# Patient Record
Sex: FEMALE | Race: ASIAN | NOT HISPANIC OR LATINO | URBAN - METROPOLITAN AREA
[De-identification: names, ages, dates, MRNs, and addresses within clinical notes are randomized per-mention and may not be internally consistent; named-entity substitution may affect disease eponyms.]

---

## 2018-04-17 ENCOUNTER — OUTPATIENT (OUTPATIENT)
Dept: OUTPATIENT SERVICES | Age: 28
LOS: 1 days | Discharge: HOME | End: 2018-04-17

## 2018-04-18 ENCOUNTER — APPOINTMENT (OUTPATIENT)
Dept: PEDIATRIC CARDIOLOGY | Facility: CLINIC | Age: 28
End: 2018-04-18

## 2018-08-14 ENCOUNTER — INPATIENT (INPATIENT)
Facility: HOSPITAL | Age: 28
LOS: 2 days | Discharge: HOME | End: 2018-08-17
Attending: OBSTETRICS & GYNECOLOGY | Admitting: OBSTETRICS & GYNECOLOGY

## 2018-08-14 DIAGNOSIS — Z3A.00 WEEKS OF GESTATION OF PREGNANCY NOT SPECIFIED: ICD-10-CM

## 2018-08-14 DIAGNOSIS — O26.899 OTHER SPECIFIED PREGNANCY RELATED CONDITIONS, UNSPECIFIED TRIMESTER: ICD-10-CM

## 2018-08-15 ENCOUNTER — TRANSCRIPTION ENCOUNTER (OUTPATIENT)
Age: 28
End: 2018-08-15

## 2018-08-15 VITALS — WEIGHT: 130.07 LBS | HEIGHT: 65 IN

## 2018-08-15 LAB
BASOPHILS # BLD AUTO: 0.05 K/UL — SIGNIFICANT CHANGE UP (ref 0–0.2)
BASOPHILS NFR BLD AUTO: 0.3 % — SIGNIFICANT CHANGE UP (ref 0–2)
BLD GP AB SCN SERPL QL: NEGATIVE — SIGNIFICANT CHANGE UP
EOSINOPHIL # BLD AUTO: 0.03 K/UL — SIGNIFICANT CHANGE UP (ref 0–0.5)
EOSINOPHIL NFR BLD AUTO: 0.2 % — SIGNIFICANT CHANGE UP (ref 0–6)
HCT VFR BLD CALC: 36.8 % — SIGNIFICANT CHANGE UP (ref 34.5–45)
HGB BLD-MCNC: 12.7 G/DL — SIGNIFICANT CHANGE UP (ref 11.5–15.5)
IMM GRANULOCYTES # BLD AUTO: 0.05 # — SIGNIFICANT CHANGE UP
IMM GRANULOCYTES NFR BLD AUTO: 0.3 % — SIGNIFICANT CHANGE UP (ref 0–1.5)
LYMPHOCYTES # BLD AUTO: 1.35 K/UL — SIGNIFICANT CHANGE UP (ref 1–3.3)
LYMPHOCYTES # BLD AUTO: 8.7 % — LOW (ref 13–44)
MCHC RBC-ENTMCNC: 32 PG — SIGNIFICANT CHANGE UP (ref 27–34)
MCHC RBC-ENTMCNC: 34.5 % — SIGNIFICANT CHANGE UP (ref 32–36)
MCV RBC AUTO: 92.7 FL — SIGNIFICANT CHANGE UP (ref 80–100)
MONOCYTES # BLD AUTO: 1.08 K/UL — HIGH (ref 0–0.9)
MONOCYTES NFR BLD AUTO: 7 % — SIGNIFICANT CHANGE UP (ref 2–14)
NEUTROPHILS # BLD AUTO: 12.87 K/UL — HIGH (ref 1.8–7.4)
NEUTROPHILS NFR BLD AUTO: 83.5 % — HIGH (ref 43–77)
NRBC # FLD: 0 — SIGNIFICANT CHANGE UP
PLATELET # BLD AUTO: 235 K/UL — SIGNIFICANT CHANGE UP (ref 150–400)
PMV BLD: 11.6 FL — SIGNIFICANT CHANGE UP (ref 7–13)
RBC # BLD: 3.97 M/UL — SIGNIFICANT CHANGE UP (ref 3.8–5.2)
RBC # FLD: 14 % — SIGNIFICANT CHANGE UP (ref 10.3–14.5)
RH IG SCN BLD-IMP: POSITIVE — SIGNIFICANT CHANGE UP
RH IG SCN BLD-IMP: POSITIVE — SIGNIFICANT CHANGE UP
WBC # BLD: 15.43 K/UL — HIGH (ref 3.8–10.5)
WBC # FLD AUTO: 15.43 K/UL — HIGH (ref 3.8–10.5)

## 2018-08-15 RX ORDER — SIMETHICONE 80 MG/1
80 TABLET, CHEWABLE ORAL EVERY 6 HOURS
Qty: 0 | Refills: 0 | Status: DISCONTINUED | OUTPATIENT
Start: 2018-08-15 | End: 2018-08-17

## 2018-08-15 RX ORDER — DIBUCAINE 1 %
1 OINTMENT (GRAM) RECTAL EVERY 4 HOURS
Qty: 0 | Refills: 0 | Status: DISCONTINUED | OUTPATIENT
Start: 2018-08-15 | End: 2018-08-17

## 2018-08-15 RX ORDER — LANOLIN
1 OINTMENT (GRAM) TOPICAL EVERY 6 HOURS
Qty: 0 | Refills: 0 | Status: DISCONTINUED | OUTPATIENT
Start: 2018-08-15 | End: 2018-08-17

## 2018-08-15 RX ORDER — ACETAMINOPHEN 500 MG
975 TABLET ORAL EVERY 6 HOURS
Qty: 0 | Refills: 0 | Status: COMPLETED | OUTPATIENT
Start: 2018-08-15 | End: 2019-07-14

## 2018-08-15 RX ORDER — ACETAMINOPHEN 500 MG
3 TABLET ORAL
Qty: 0 | Refills: 0 | COMMUNITY
Start: 2018-08-15

## 2018-08-15 RX ORDER — SODIUM CHLORIDE 9 MG/ML
3 INJECTION INTRAMUSCULAR; INTRAVENOUS; SUBCUTANEOUS EVERY 8 HOURS
Qty: 0 | Refills: 0 | Status: DISCONTINUED | OUTPATIENT
Start: 2018-08-15 | End: 2018-08-15

## 2018-08-15 RX ORDER — OXYCODONE HYDROCHLORIDE 5 MG/1
5 TABLET ORAL
Qty: 0 | Refills: 0 | Status: DISCONTINUED | OUTPATIENT
Start: 2018-08-15 | End: 2018-08-17

## 2018-08-15 RX ORDER — IBUPROFEN 200 MG
600 TABLET ORAL EVERY 6 HOURS
Qty: 0 | Refills: 0 | Status: COMPLETED | OUTPATIENT
Start: 2018-08-15 | End: 2019-07-14

## 2018-08-15 RX ORDER — SODIUM CHLORIDE 9 MG/ML
3 INJECTION INTRAMUSCULAR; INTRAVENOUS; SUBCUTANEOUS EVERY 8 HOURS
Qty: 0 | Refills: 0 | Status: DISCONTINUED | OUTPATIENT
Start: 2018-08-15 | End: 2018-08-17

## 2018-08-15 RX ORDER — DIPHENHYDRAMINE HCL 50 MG
25 CAPSULE ORAL EVERY 6 HOURS
Qty: 0 | Refills: 0 | Status: DISCONTINUED | OUTPATIENT
Start: 2018-08-15 | End: 2018-08-17

## 2018-08-15 RX ORDER — MAGNESIUM HYDROXIDE 400 MG/1
30 TABLET, CHEWABLE ORAL
Qty: 0 | Refills: 0 | Status: DISCONTINUED | OUTPATIENT
Start: 2018-08-15 | End: 2018-08-17

## 2018-08-15 RX ORDER — HYDROCORTISONE 1 %
1 OINTMENT (GRAM) TOPICAL EVERY 4 HOURS
Qty: 0 | Refills: 0 | Status: DISCONTINUED | OUTPATIENT
Start: 2018-08-15 | End: 2018-08-15

## 2018-08-15 RX ORDER — PRAMOXINE HYDROCHLORIDE 150 MG/15G
1 AEROSOL, FOAM RECTAL EVERY 4 HOURS
Qty: 0 | Refills: 0 | Status: DISCONTINUED | OUTPATIENT
Start: 2018-08-15 | End: 2018-08-15

## 2018-08-15 RX ORDER — CITRIC ACID/SODIUM CITRATE 300-500 MG
15 SOLUTION, ORAL ORAL EVERY 4 HOURS
Qty: 0 | Refills: 0 | Status: DISCONTINUED | OUTPATIENT
Start: 2018-08-15 | End: 2018-08-15

## 2018-08-15 RX ORDER — GLYCERIN ADULT
1 SUPPOSITORY, RECTAL RECTAL AT BEDTIME
Qty: 0 | Refills: 0 | Status: DISCONTINUED | OUTPATIENT
Start: 2018-08-15 | End: 2018-08-17

## 2018-08-15 RX ORDER — DIBUCAINE 1 %
1 OINTMENT (GRAM) RECTAL EVERY 4 HOURS
Qty: 0 | Refills: 0 | Status: DISCONTINUED | OUTPATIENT
Start: 2018-08-15 | End: 2018-08-15

## 2018-08-15 RX ORDER — PRAMOXINE HYDROCHLORIDE 150 MG/15G
1 AEROSOL, FOAM RECTAL EVERY 4 HOURS
Qty: 0 | Refills: 0 | Status: DISCONTINUED | OUTPATIENT
Start: 2018-08-15 | End: 2018-08-17

## 2018-08-15 RX ORDER — AER TRAVELER 0.5 G/1
1 SOLUTION RECTAL; TOPICAL EVERY 4 HOURS
Qty: 0 | Refills: 0 | Status: DISCONTINUED | OUTPATIENT
Start: 2018-08-15 | End: 2018-08-17

## 2018-08-15 RX ORDER — OXYTOCIN 10 UNIT/ML
41.67 VIAL (ML) INJECTION
Qty: 20 | Refills: 0 | Status: DISCONTINUED | OUTPATIENT
Start: 2018-08-15 | End: 2018-08-15

## 2018-08-15 RX ORDER — IBUPROFEN 200 MG
600 TABLET ORAL EVERY 6 HOURS
Qty: 0 | Refills: 0 | Status: DISCONTINUED | OUTPATIENT
Start: 2018-08-15 | End: 2018-08-17

## 2018-08-15 RX ORDER — DOCUSATE SODIUM 100 MG
100 CAPSULE ORAL
Qty: 0 | Refills: 0 | Status: DISCONTINUED | OUTPATIENT
Start: 2018-08-15 | End: 2018-08-17

## 2018-08-15 RX ORDER — SODIUM CHLORIDE 9 MG/ML
1000 INJECTION, SOLUTION INTRAVENOUS ONCE
Qty: 0 | Refills: 0 | Status: DISCONTINUED | OUTPATIENT
Start: 2018-08-15 | End: 2018-08-15

## 2018-08-15 RX ORDER — SODIUM CHLORIDE 9 MG/ML
1000 INJECTION, SOLUTION INTRAVENOUS
Qty: 0 | Refills: 0 | Status: DISCONTINUED | OUTPATIENT
Start: 2018-08-15 | End: 2018-08-15

## 2018-08-15 RX ORDER — AER TRAVELER 0.5 G/1
1 SOLUTION RECTAL; TOPICAL EVERY 4 HOURS
Qty: 0 | Refills: 0 | Status: DISCONTINUED | OUTPATIENT
Start: 2018-08-15 | End: 2018-08-15

## 2018-08-15 RX ORDER — TETANUS TOXOID, REDUCED DIPHTHERIA TOXOID AND ACELLULAR PERTUSSIS VACCINE, ADSORBED 5; 2.5; 8; 8; 2.5 [IU]/.5ML; [IU]/.5ML; UG/.5ML; UG/.5ML; UG/.5ML
0.5 SUSPENSION INTRAMUSCULAR ONCE
Qty: 0 | Refills: 0 | Status: DISCONTINUED | OUTPATIENT
Start: 2018-08-15 | End: 2018-08-17

## 2018-08-15 RX ORDER — AMPICILLIN TRIHYDRATE 250 MG
1 CAPSULE ORAL EVERY 4 HOURS
Qty: 0 | Refills: 0 | Status: DISCONTINUED | OUTPATIENT
Start: 2018-08-15 | End: 2018-08-15

## 2018-08-15 RX ORDER — KETOROLAC TROMETHAMINE 30 MG/ML
30 SYRINGE (ML) INJECTION ONCE
Qty: 0 | Refills: 0 | Status: DISCONTINUED | OUTPATIENT
Start: 2018-08-15 | End: 2018-08-15

## 2018-08-15 RX ORDER — AMPICILLIN TRIHYDRATE 250 MG
2 CAPSULE ORAL ONCE
Qty: 0 | Refills: 0 | Status: COMPLETED | OUTPATIENT
Start: 2018-08-15 | End: 2018-08-15

## 2018-08-15 RX ORDER — ACETAMINOPHEN 500 MG
975 TABLET ORAL EVERY 6 HOURS
Qty: 0 | Refills: 0 | Status: DISCONTINUED | OUTPATIENT
Start: 2018-08-15 | End: 2018-08-17

## 2018-08-15 RX ORDER — AMPICILLIN TRIHYDRATE 250 MG
CAPSULE ORAL
Qty: 0 | Refills: 0 | Status: DISCONTINUED | OUTPATIENT
Start: 2018-08-15 | End: 2018-08-15

## 2018-08-15 RX ORDER — OXYTOCIN 10 UNIT/ML
333.33 VIAL (ML) INJECTION
Qty: 20 | Refills: 0 | Status: DISCONTINUED | OUTPATIENT
Start: 2018-08-15 | End: 2018-08-15

## 2018-08-15 RX ORDER — HYDROCORTISONE 1 %
1 OINTMENT (GRAM) TOPICAL EVERY 4 HOURS
Qty: 0 | Refills: 0 | Status: DISCONTINUED | OUTPATIENT
Start: 2018-08-15 | End: 2018-08-17

## 2018-08-15 RX ORDER — OXYCODONE HYDROCHLORIDE 5 MG/1
5 TABLET ORAL EVERY 4 HOURS
Qty: 0 | Refills: 0 | Status: DISCONTINUED | OUTPATIENT
Start: 2018-08-15 | End: 2018-08-17

## 2018-08-15 RX ORDER — IBUPROFEN 200 MG
1 TABLET ORAL
Qty: 0 | Refills: 0 | COMMUNITY
Start: 2018-08-15

## 2018-08-15 RX ADMIN — Medication 30 MILLIGRAM(S): at 03:01

## 2018-08-15 RX ADMIN — Medication 30 MILLIGRAM(S): at 03:20

## 2018-08-15 RX ADMIN — SODIUM CHLORIDE 3 MILLILITER(S): 9 INJECTION INTRAMUSCULAR; INTRAVENOUS; SUBCUTANEOUS at 14:01

## 2018-08-15 RX ADMIN — Medication 100 MILLIGRAM(S): at 22:35

## 2018-08-15 RX ADMIN — Medication 125 MILLIUNIT(S)/MIN: at 02:37

## 2018-08-15 RX ADMIN — Medication 216 GRAM(S): at 01:35

## 2018-08-15 RX ADMIN — SODIUM CHLORIDE 3 MILLILITER(S): 9 INJECTION INTRAMUSCULAR; INTRAVENOUS; SUBCUTANEOUS at 06:38

## 2018-08-15 RX ADMIN — Medication 1 TABLET(S): at 11:45

## 2018-08-15 NOTE — DISCHARGE NOTE OB - PATIENT PORTAL LINK FT
You can access the [facility] Patient Portal, offered by [facility], by registering with the following website: [email or web address]

## 2018-08-15 NOTE — DISCHARGE NOTE OB - MEDICATION SUMMARY - MEDICATIONS TO TAKE
I will START or STAY ON the medications listed below when I get home from the hospital:    acetaminophen 325 mg oral tablet  -- 3 tab(s) by mouth every 6 hours  -- Indication: For as needed for pain    ibuprofen 600 mg oral tablet  -- 1 tab(s) by mouth every 6 hours  -- Indication: For as needed for pain
None

## 2018-08-15 NOTE — DISCHARGE NOTE OB - CARE PROVIDER_API CALL
Yenny Hopper), Obstetrics and Gynecology  [address]  [address]  [place] [address]  Phone: [phone]  Fax: [phone]

## 2018-08-15 NOTE — PATIENT PROFILE OB - VISION (WITH CORRECTIVE LENSES IF THE PATIENT USUALLY WEARS THEM):
[place] Normal vision: sees adequately in most situations; can see medication labels, newsprint

## 2018-08-15 NOTE — DISCHARGE NOTE OB - MATERIALS PROVIDED
Todd  Immunization Record/Breastfeeding Log/Breastfeeding Mother’s Support Group Information/Guide to Postpartum Care/Back To Sleep Handout/MMR Vaccination (VIS Pub Date: 2012)/Tdap Vaccination (VIS Pub Date: 2012)/Discharge Medication Information for Patients and Families Pocket Guide/Vaccinations/[facility] Hearing Screen Program/Shaken Baby Prevention Handout/[facility] Todd Screening Program/Bottle Feeding Log/Breastfeeding Guide and Packet/Birth Certificate Instructions

## 2018-08-16 LAB — T PALLIDUM AB TITR SER: NEGATIVE — SIGNIFICANT CHANGE UP

## 2018-08-16 RX ADMIN — SODIUM CHLORIDE 3 MILLILITER(S): 9 INJECTION INTRAMUSCULAR; INTRAVENOUS; SUBCUTANEOUS at 05:44

## 2018-08-16 RX ADMIN — Medication 100 MILLIGRAM(S): at 21:06

## 2018-08-16 RX ADMIN — Medication 600 MILLIGRAM(S): at 15:26

## 2018-08-16 RX ADMIN — Medication 600 MILLIGRAM(S): at 21:06

## 2018-08-16 RX ADMIN — Medication 600 MILLIGRAM(S): at 22:00

## 2018-08-16 RX ADMIN — Medication 600 MILLIGRAM(S): at 10:30

## 2018-08-16 RX ADMIN — SODIUM CHLORIDE 3 MILLILITER(S): 9 INJECTION INTRAMUSCULAR; INTRAVENOUS; SUBCUTANEOUS at 13:26

## 2018-08-16 RX ADMIN — Medication 600 MILLIGRAM(S): at 16:20

## 2018-08-16 RX ADMIN — Medication 600 MILLIGRAM(S): at 01:57

## 2018-08-16 RX ADMIN — Medication 600 MILLIGRAM(S): at 09:44

## 2018-08-16 RX ADMIN — Medication 600 MILLIGRAM(S): at 02:50

## 2018-08-16 NOTE — PROGRESS NOTE ADULT - SUBJECTIVE AND OBJECTIVE BOX
S: Patient doing well. Minimal lochia. Pain controlled.    O: Vital Signs Last 24 Hrs  T(C): 36.6 (16 Aug 2018 05:03), Max: 37.7 (15 Aug 2018 19:54)  T(F): 97.8 (16 Aug 2018 05:03), Max: 99.9 (15 Aug 2018 19:54)  HR: 66 (16 Aug 2018 05:03) (64 - 70)  BP: 107/63 (16 Aug 2018 05:03) (101/56 - 107/63)  BP(mean): --  RR: 18 (16 Aug 2018 05:03) (18 - 18)  SpO2: 99% (16 Aug 2018 05:03) (99% - 100%)    Gen: NAD  Abd: soft, NT, ND, fundus firm below umbilicus  Lochia: moderate  Ext: no tenderness    Labs:                        12.7   15.43 )-----------( 235      ( 15 Aug 2018 01:20 )             36.8       A: 28y PPD# 1 s/p  doing well.    Plan:stable ppd 1 offers no issues instructions given for discharge in am

## 2018-08-17 VITALS — SYSTOLIC BLOOD PRESSURE: 102 MMHG | DIASTOLIC BLOOD PRESSURE: 58 MMHG

## 2018-08-17 RX ADMIN — Medication 1 TABLET(S): at 11:45

## 2018-08-17 RX ADMIN — Medication 975 MILLIGRAM(S): at 11:45

## 2018-08-17 RX ADMIN — Medication 600 MILLIGRAM(S): at 06:15

## 2018-08-17 RX ADMIN — Medication 975 MILLIGRAM(S): at 12:35

## 2018-08-17 RX ADMIN — Medication 600 MILLIGRAM(S): at 05:36

## 2019-02-04 ENCOUNTER — OUTPATIENT (OUTPATIENT)
Dept: OUTPATIENT SERVICES | Facility: HOSPITAL | Age: 29
LOS: 1 days | End: 2019-02-04

## 2019-02-04 VITALS — HEIGHT: 63 IN | WEIGHT: 108.03 LBS

## 2019-02-04 DIAGNOSIS — D27.9 BENIGN NEOPLASM OF UNSPECIFIED OVARY: ICD-10-CM

## 2019-02-04 DIAGNOSIS — N83.201 UNSPECIFIED OVARIAN CYST, RIGHT SIDE: ICD-10-CM

## 2019-02-04 LAB
BLD GP AB SCN SERPL QL: NEGATIVE — SIGNIFICANT CHANGE UP
HCG SERPL-ACNC: < 5 MIU/ML — SIGNIFICANT CHANGE UP
HCT VFR BLD CALC: 40.1 % — SIGNIFICANT CHANGE UP (ref 34.5–45)
HGB BLD-MCNC: 12.7 G/DL — SIGNIFICANT CHANGE UP (ref 11.5–15.5)
MCHC RBC-ENTMCNC: 30.7 PG — SIGNIFICANT CHANGE UP (ref 27–34)
MCHC RBC-ENTMCNC: 31.7 % — LOW (ref 32–36)
MCV RBC AUTO: 96.9 FL — SIGNIFICANT CHANGE UP (ref 80–100)
NRBC # FLD: 0 K/UL — LOW (ref 25–125)
PLATELET # BLD AUTO: 348 K/UL — SIGNIFICANT CHANGE UP (ref 150–400)
PMV BLD: 10.9 FL — SIGNIFICANT CHANGE UP (ref 7–13)
RBC # BLD: 4.14 M/UL — SIGNIFICANT CHANGE UP (ref 3.8–5.2)
RBC # FLD: 12.2 % — SIGNIFICANT CHANGE UP (ref 10.3–14.5)
RH IG SCN BLD-IMP: POSITIVE — SIGNIFICANT CHANGE UP
WBC # BLD: 5.7 K/UL — SIGNIFICANT CHANGE UP (ref 3.8–10.5)
WBC # FLD AUTO: 5.7 K/UL — SIGNIFICANT CHANGE UP (ref 3.8–10.5)

## 2019-02-04 RX ORDER — SODIUM CHLORIDE 9 MG/ML
1000 INJECTION, SOLUTION INTRAVENOUS
Qty: 0 | Refills: 0 | Status: DISCONTINUED | OUTPATIENT
Start: 2019-02-15 | End: 2019-03-02

## 2019-02-04 NOTE — H&P PST ADULT - PROBLEM SELECTOR PLAN 1
Patient scheduled right ovarian laparoscopic cystectomy on 2/15/19.  Pre op and Hibiclens instructions given and explained.  Avoid NSAIDs and OTC supplements.   Patient verbalized understanding

## 2019-02-04 NOTE — H&P PST ADULT - NSANTHOSAYNRD_GEN_A_CORE
No. THAD screening performed.  STOP BANG Legend: 0-2 = LOW Risk; 3-4 = INTERMEDIATE Risk; 5-8 = HIGH Risk

## 2019-02-04 NOTE — H&P PST ADULT - HISTORY OF PRESENT ILLNESS
30 y/o female presents to PST for scheduled right ovarian laparoscopic cystectomy on 2/15/19. Patient states during pregnancy (delivered nvd 8/15/18) cyst noted and advised after deliver to have removed.

## 2019-02-07 NOTE — PATIENT PROFILE OB - AS DELIV COMPLICATIONS OB
Acute on chronic systolic heart failure    Bipolar depression    CHF (congestive heart failure)    Chronic pain    COPD (chronic obstructive pulmonary disease)    CVA (cerebral vascular accident)    CVA (cerebral vascular accident)    Depression    Depression    Diabetes    DM (diabetes mellitus)    DVT (deep venous thrombosis)    HLD (hyperlipidemia)    HTN (hypertension)    Neuropathy    PAD (peripheral artery disease) abnormal fetal heart rate tracing/precipitous delivery

## 2019-02-14 ENCOUNTER — TRANSCRIPTION ENCOUNTER (OUTPATIENT)
Age: 29
End: 2019-02-14

## 2019-02-15 ENCOUNTER — OUTPATIENT (OUTPATIENT)
Dept: OUTPATIENT SERVICES | Facility: HOSPITAL | Age: 29
LOS: 1 days | Discharge: HOME | End: 2019-02-15
Payer: COMMERCIAL

## 2019-02-15 ENCOUNTER — RESULT REVIEW (OUTPATIENT)
Age: 29
End: 2019-02-15

## 2019-02-15 VITALS
HEIGHT: 63 IN | RESPIRATION RATE: 15 BRPM | HEART RATE: 83 BPM | WEIGHT: 108.03 LBS | SYSTOLIC BLOOD PRESSURE: 94 MMHG | DIASTOLIC BLOOD PRESSURE: 58 MMHG | TEMPERATURE: 97 F | OXYGEN SATURATION: 100 %

## 2019-02-15 VITALS
HEART RATE: 62 BPM | SYSTOLIC BLOOD PRESSURE: 110 MMHG | TEMPERATURE: 97 F | RESPIRATION RATE: 12 BRPM | OXYGEN SATURATION: 100 % | DIASTOLIC BLOOD PRESSURE: 54 MMHG

## 2019-02-15 DIAGNOSIS — D27.9 BENIGN NEOPLASM OF UNSPECIFIED OVARY: ICD-10-CM

## 2019-02-15 LAB — HCG UR QL: NEGATIVE — SIGNIFICANT CHANGE UP

## 2019-02-15 PROCEDURE — 88305 TISSUE EXAM BY PATHOLOGIST: CPT | Mod: 26

## 2019-02-15 RX ORDER — SODIUM CHLORIDE 9 MG/ML
1000 INJECTION, SOLUTION INTRAVENOUS
Qty: 0 | Refills: 0 | Status: DISCONTINUED | OUTPATIENT
Start: 2019-02-15 | End: 2019-03-02

## 2019-02-15 RX ORDER — NORETHINDRONE 0.35 MG/1
1 TABLET ORAL
Qty: 0 | Refills: 0 | COMMUNITY

## 2019-02-15 RX ORDER — OXYCODONE HYDROCHLORIDE 5 MG/1
1 TABLET ORAL
Qty: 6 | Refills: 0
Start: 2019-02-15

## 2019-02-15 NOTE — ASU DISCHARGE PLAN (ADULT/PEDIATRIC). - ITEMS TO FOLLOWUP WITH YOUR PHYSICIAN'S
Return to your regular way of eating.  Resume normal activity as tolerated, but no heavy lifting or strenuous activity for 6 weeks.  No driving for next 2 weeks and/or while on narcotic pain medication.  Complete vaginal rest, no tampons, no douching, no tub bathing, no sexual activities for 6 weeks unless otherwise instructed by your doctor.  Call your doctor with any signs and symptoms of infection such as fever, chills, nausea or vomiting.  Call your doctor with redness or swelling at the incision site, fluid leakage or wound separation.  Call your doctor if you're unable to tolerate food or have difficulty urinating.  Call your doctor if you have pain that is not relieved by your prescribed medications.  Notify your doctor with any other concerns.  Follow up with Dr. Hopper in 2 weeks.

## 2019-02-15 NOTE — ASU DISCHARGE PLAN (ADULT/PEDIATRIC). - NURSING INSTRUCTIONS
You received IV Tylenol for pain management at 1:00 PM. Please DO NOT take any Tylenol (Acetaminophen) containing products, such as Vicodin, Percocet, Excedrin, and cold medications for the next 6 hours (until 7:00 PM). DO NOT TAKE MORE THAN 3000 MG OF TYLENOL in a 24 hour period.     You received IV Toradol for pain management at 2:45 PM. Please DO NOT take Motrin/Ibuprofen/Advil/Aleve/NSAIDs (Non-Steroidal Anti-Inflammatory Drugs) for the next 6 hours (until 8:45 PM).

## 2019-02-22 LAB — SURGICAL PATHOLOGY STUDY: SIGNIFICANT CHANGE UP

## 2020-04-25 ENCOUNTER — MESSAGE (OUTPATIENT)
Age: 30
End: 2020-04-25

## 2020-04-30 LAB
SARS-COV-2 IGG SERPL IA-ACNC: <0.1 INDEX
SARS-COV-2 IGG SERPL QL IA: NEGATIVE

## 2020-08-08 NOTE — PATIENT PROFILE OB - FALL HARM RISK CONCLUSION
Pt aaox4, independent.voids per urinal. SSi and scheduled insulin given. Pt off floor independently, no signs of distress noted.   Universal Safety Interventions

## 2020-10-16 ENCOUNTER — TRANSCRIPTION ENCOUNTER (OUTPATIENT)
Age: 30
End: 2020-10-16

## 2020-10-16 ENCOUNTER — INPATIENT (INPATIENT)
Facility: HOSPITAL | Age: 30
LOS: 0 days | Discharge: HOME | End: 2020-10-17
Attending: OBSTETRICS & GYNECOLOGY | Admitting: OBSTETRICS & GYNECOLOGY
Payer: COMMERCIAL

## 2020-10-16 VITALS — TEMPERATURE: 98 F

## 2020-10-16 DIAGNOSIS — Z98.890 OTHER SPECIFIED POSTPROCEDURAL STATES: Chronic | ICD-10-CM

## 2020-10-16 DIAGNOSIS — Z3A.00 WEEKS OF GESTATION OF PREGNANCY NOT SPECIFIED: ICD-10-CM

## 2020-10-16 DIAGNOSIS — O26.899 OTHER SPECIFIED PREGNANCY RELATED CONDITIONS, UNSPECIFIED TRIMESTER: ICD-10-CM

## 2020-10-16 LAB
BASOPHILS # BLD AUTO: 0.05 K/UL — SIGNIFICANT CHANGE UP (ref 0–0.2)
BASOPHILS NFR BLD AUTO: 0.6 % — SIGNIFICANT CHANGE UP (ref 0–2)
BLD GP AB SCN SERPL QL: NEGATIVE — SIGNIFICANT CHANGE UP
EOSINOPHIL # BLD AUTO: 0.12 K/UL — SIGNIFICANT CHANGE UP (ref 0–0.5)
EOSINOPHIL NFR BLD AUTO: 1.4 % — SIGNIFICANT CHANGE UP (ref 0–6)
HCT VFR BLD CALC: 34.2 % — LOW (ref 34.5–45)
HGB BLD-MCNC: 11.3 G/DL — LOW (ref 11.5–15.5)
IMM GRANULOCYTES NFR BLD AUTO: 0.5 % — SIGNIFICANT CHANGE UP (ref 0–1.5)
LYMPHOCYTES # BLD AUTO: 1.4 K/UL — SIGNIFICANT CHANGE UP (ref 1–3.3)
LYMPHOCYTES # BLD AUTO: 16.3 % — SIGNIFICANT CHANGE UP (ref 13–44)
MCHC RBC-ENTMCNC: 30.6 PG — SIGNIFICANT CHANGE UP (ref 27–34)
MCHC RBC-ENTMCNC: 33 % — SIGNIFICANT CHANGE UP (ref 32–36)
MCV RBC AUTO: 92.7 FL — SIGNIFICANT CHANGE UP (ref 80–100)
MONOCYTES # BLD AUTO: 0.78 K/UL — SIGNIFICANT CHANGE UP (ref 0–0.9)
MONOCYTES NFR BLD AUTO: 9.1 % — SIGNIFICANT CHANGE UP (ref 2–14)
NEUTROPHILS # BLD AUTO: 6.21 K/UL — SIGNIFICANT CHANGE UP (ref 1.8–7.4)
NEUTROPHILS NFR BLD AUTO: 72.1 % — SIGNIFICANT CHANGE UP (ref 43–77)
NRBC # FLD: 0 K/UL — SIGNIFICANT CHANGE UP (ref 0–0)
PLATELET # BLD AUTO: 266 K/UL — SIGNIFICANT CHANGE UP (ref 150–400)
PMV BLD: 11 FL — SIGNIFICANT CHANGE UP (ref 7–13)
RBC # BLD: 3.69 M/UL — LOW (ref 3.8–5.2)
RBC # FLD: 13.3 % — SIGNIFICANT CHANGE UP (ref 10.3–14.5)
RH IG SCN BLD-IMP: POSITIVE — SIGNIFICANT CHANGE UP
SARS-COV-2 IGG SERPL QL IA: NEGATIVE — SIGNIFICANT CHANGE UP
SARS-COV-2 IGM SERPL IA-ACNC: <0.1 INDEX — SIGNIFICANT CHANGE UP
SARS-COV-2 RNA SPEC QL NAA+PROBE: SIGNIFICANT CHANGE UP
T PALLIDUM AB TITR SER: NEGATIVE — SIGNIFICANT CHANGE UP
WBC # BLD: 8.6 K/UL — SIGNIFICANT CHANGE UP (ref 3.8–10.5)
WBC # FLD AUTO: 8.6 K/UL — SIGNIFICANT CHANGE UP (ref 3.8–10.5)

## 2020-10-16 PROCEDURE — 71045 X-RAY EXAM CHEST 1 VIEW: CPT | Mod: 26

## 2020-10-16 RX ORDER — IBUPROFEN 200 MG
1 TABLET ORAL
Qty: 0 | Refills: 0 | DISCHARGE
Start: 2020-10-16

## 2020-10-16 RX ORDER — SODIUM CHLORIDE 9 MG/ML
1000 INJECTION, SOLUTION INTRAVENOUS
Refills: 0 | Status: DISCONTINUED | OUTPATIENT
Start: 2020-10-16 | End: 2020-10-16

## 2020-10-16 RX ORDER — DIPHENHYDRAMINE HCL 50 MG
25 CAPSULE ORAL EVERY 6 HOURS
Refills: 0 | Status: DISCONTINUED | OUTPATIENT
Start: 2020-10-16 | End: 2020-10-17

## 2020-10-16 RX ORDER — KETOROLAC TROMETHAMINE 30 MG/ML
30 SYRINGE (ML) INJECTION ONCE
Refills: 0 | Status: DISCONTINUED | OUTPATIENT
Start: 2020-10-16 | End: 2020-10-16

## 2020-10-16 RX ORDER — ACETAMINOPHEN 500 MG
3 TABLET ORAL
Qty: 0 | Refills: 0 | DISCHARGE
Start: 2020-10-16

## 2020-10-16 RX ORDER — HYDROCORTISONE 1 %
1 OINTMENT (GRAM) TOPICAL EVERY 6 HOURS
Refills: 0 | Status: DISCONTINUED | OUTPATIENT
Start: 2020-10-16 | End: 2020-10-17

## 2020-10-16 RX ORDER — BENZOCAINE 10 %
1 GEL (GRAM) MUCOUS MEMBRANE EVERY 6 HOURS
Refills: 0 | Status: DISCONTINUED | OUTPATIENT
Start: 2020-10-16 | End: 2020-10-17

## 2020-10-16 RX ORDER — ACETAMINOPHEN 500 MG
975 TABLET ORAL
Refills: 0 | Status: DISCONTINUED | OUTPATIENT
Start: 2020-10-16 | End: 2020-10-17

## 2020-10-16 RX ORDER — TETANUS TOXOID, REDUCED DIPHTHERIA TOXOID AND ACELLULAR PERTUSSIS VACCINE, ADSORBED 5; 2.5; 8; 8; 2.5 [IU]/.5ML; [IU]/.5ML; UG/.5ML; UG/.5ML; UG/.5ML
0.5 SUSPENSION INTRAMUSCULAR ONCE
Refills: 0 | Status: DISCONTINUED | OUTPATIENT
Start: 2020-10-16 | End: 2020-10-17

## 2020-10-16 RX ORDER — PRAMOXINE HYDROCHLORIDE 150 MG/15G
1 AEROSOL, FOAM RECTAL EVERY 4 HOURS
Refills: 0 | Status: DISCONTINUED | OUTPATIENT
Start: 2020-10-16 | End: 2020-10-17

## 2020-10-16 RX ORDER — ACETAMINOPHEN 500 MG
3 TABLET ORAL
Qty: 0 | Refills: 0 | DISCHARGE

## 2020-10-16 RX ORDER — IBUPROFEN 200 MG
600 TABLET ORAL EVERY 6 HOURS
Refills: 0 | Status: DISCONTINUED | OUTPATIENT
Start: 2020-10-16 | End: 2020-10-17

## 2020-10-16 RX ORDER — LANOLIN
1 OINTMENT (GRAM) TOPICAL EVERY 6 HOURS
Refills: 0 | Status: DISCONTINUED | OUTPATIENT
Start: 2020-10-16 | End: 2020-10-17

## 2020-10-16 RX ORDER — AER TRAVELER 0.5 G/1
1 SOLUTION RECTAL; TOPICAL EVERY 4 HOURS
Refills: 0 | Status: DISCONTINUED | OUTPATIENT
Start: 2020-10-16 | End: 2020-10-17

## 2020-10-16 RX ORDER — IBUPROFEN 200 MG
600 TABLET ORAL EVERY 6 HOURS
Refills: 0 | Status: COMPLETED | OUTPATIENT
Start: 2020-10-16 | End: 2021-09-14

## 2020-10-16 RX ORDER — DIBUCAINE 1 %
1 OINTMENT (GRAM) RECTAL EVERY 6 HOURS
Refills: 0 | Status: DISCONTINUED | OUTPATIENT
Start: 2020-10-16 | End: 2020-10-17

## 2020-10-16 RX ORDER — MAGNESIUM HYDROXIDE 400 MG/1
30 TABLET, CHEWABLE ORAL
Refills: 0 | Status: DISCONTINUED | OUTPATIENT
Start: 2020-10-16 | End: 2020-10-17

## 2020-10-16 RX ORDER — OXYTOCIN 10 UNIT/ML
333.33 VIAL (ML) INJECTION
Qty: 20 | Refills: 0 | Status: DISCONTINUED | OUTPATIENT
Start: 2020-10-16 | End: 2020-10-16

## 2020-10-16 RX ORDER — OXYCODONE HYDROCHLORIDE 5 MG/1
5 TABLET ORAL ONCE
Refills: 0 | Status: DISCONTINUED | OUTPATIENT
Start: 2020-10-16 | End: 2020-10-17

## 2020-10-16 RX ORDER — SODIUM CHLORIDE 9 MG/ML
3 INJECTION INTRAMUSCULAR; INTRAVENOUS; SUBCUTANEOUS EVERY 8 HOURS
Refills: 0 | Status: DISCONTINUED | OUTPATIENT
Start: 2020-10-16 | End: 2020-10-17

## 2020-10-16 RX ORDER — SIMETHICONE 80 MG/1
80 TABLET, CHEWABLE ORAL EVERY 4 HOURS
Refills: 0 | Status: DISCONTINUED | OUTPATIENT
Start: 2020-10-16 | End: 2020-10-17

## 2020-10-16 RX ORDER — SODIUM CHLORIDE 9 MG/ML
1000 INJECTION, SOLUTION INTRAVENOUS ONCE
Refills: 0 | Status: COMPLETED | OUTPATIENT
Start: 2020-10-16 | End: 2020-10-16

## 2020-10-16 RX ORDER — IBUPROFEN 200 MG
1 TABLET ORAL
Qty: 0 | Refills: 0 | DISCHARGE

## 2020-10-16 RX ORDER — OXYCODONE HYDROCHLORIDE 5 MG/1
5 TABLET ORAL
Refills: 0 | Status: DISCONTINUED | OUTPATIENT
Start: 2020-10-16 | End: 2020-10-17

## 2020-10-16 RX ADMIN — SODIUM CHLORIDE 125 MILLILITER(S): 9 INJECTION, SOLUTION INTRAVENOUS at 06:00

## 2020-10-16 RX ADMIN — SODIUM CHLORIDE 1000 MILLILITER(S): 9 INJECTION, SOLUTION INTRAVENOUS at 05:20

## 2020-10-16 RX ADMIN — Medication 975 MILLIGRAM(S): at 21:06

## 2020-10-16 RX ADMIN — Medication 975 MILLIGRAM(S): at 20:13

## 2020-10-16 RX ADMIN — Medication 1000 MILLIUNIT(S)/MIN: at 07:47

## 2020-10-16 RX ADMIN — Medication 30 MILLIGRAM(S): at 08:57

## 2020-10-16 RX ADMIN — SODIUM CHLORIDE 3 MILLILITER(S): 9 INJECTION INTRAMUSCULAR; INTRAVENOUS; SUBCUTANEOUS at 20:22

## 2020-10-16 RX ADMIN — Medication 30 MILLIGRAM(S): at 08:47

## 2020-10-16 NOTE — OB PROVIDER TRIAGE NOTE - NSOBPROVIDERNOTE_OBGYN_ALL_OB_FT
Vital Signs Last 24 Hrs  T(C): 36.6 (16 Oct 2020 04:49), Max: 36.6 (16 Oct 2020 04:44)  T(F): 97.9 (16 Oct 2020 04:49), Max: 97.9 (16 Oct 2020 04:49)  HR: 95 (16 Oct 2020 04:49) (95 - 95)  BP: 118/74 (16 Oct 2020 04:49) (118/74 - 118/74)  BP(mean): --  RR: 17 (16 Oct 2020 04:49) (17 - 17)  SpO2: --    General: A&O x3  ABdomen: soft, non tender  SVE: 6-7/80/-3  TAS: vertex presentation  EFW 3175gm by Leopold's     NST reactive with moderate variability, cat 1  toco ctx 1-2minutes    Discussed patient's history and physical exam with Dr Singh and Dr Vergara  29y/o pt 38.4 weeks  presents in labor   Plan:   -Admit l&d. Routine labs  -Expectant management of labor  -Fetus: cat 1 tracing, vertex presentation, 3175gm, continuos monitoring  -GBS negative   -Pain: patient requesting epidural   -Covid 19 pending   -Repeat QuantiFeron for indeterminate QuantiFeron on 10/02/2020

## 2020-10-16 NOTE — OB PROVIDER H&P - ATTENDING COMMENTS
31y/o pt 38.4 weeks  presents in labor   -Admit l&d. Routine labs  -Expectant management of labor  follow up repeat Quantiferon

## 2020-10-16 NOTE — OB PROVIDER H&P - PROBLEM SELECTOR PLAN 1
-Admit l&d. Routine labs  -Expectant management of labor  -Fetus: cat 1 tracing, vertex presentation, 3175gm, continuos monitoring  -GBS negative   -Pain: patient requesting epidural   -Covid 19 pending   -Repeat QuantiFeron for indeterminate QuantiFeron on 10/02/2020

## 2020-10-16 NOTE — DISCHARGE NOTE OB - MEDICATION SUMMARY - MEDICATIONS TO TAKE
I will START or STAY ON the medications listed below when I get home from the hospital:    acetaminophen 325 mg oral tablet  -- 3 tab(s) by mouth   -- Indication: For for pain    ibuprofen 600 mg oral tablet  -- 1 tab(s) by mouth every 6 hours  -- Indication: For for pain   I will START or STAY ON the medications listed below when I get home from the hospital:    ibuprofen 600 mg oral tablet  -- 1 tab(s) by mouth every 6 hours, As Needed  -- Indication: For pain    acetaminophen 325 mg oral tablet  -- 3 tab(s) by mouth , As Needed  -- Indication: For pain

## 2020-10-16 NOTE — DISCHARGE NOTE OB - PATIENT PORTAL LINK FT
You can access the FollowMyHealth Patient Portal offered by [facility] by registering at the following website: [email or web address]. By joining [facility]’s FollowMyHealth portal, you will also be able to view your health information using other applications (apps) compatible with our system.

## 2020-10-16 NOTE — OB PROVIDER H&P - ASSESSMENT
Vital Signs Last 24 Hrs  T(C): 36.6 (16 Oct 2020 04:49), Max: 36.6 (16 Oct 2020 04:44)  T(F): 97.9 (16 Oct 2020 04:49), Max: 97.9 (16 Oct 2020 04:49)  HR: 95 (16 Oct 2020 04:49) (95 - 95)  BP: 118/74 (16 Oct 2020 04:49) (118/74 - 118/74)  BP(mean): --  RR: 17 (16 Oct 2020 04:49) (17 - 17)  SpO2: --    General: A&O x3  ABdomen: soft, non tender  SVE: 6-7/80/-3  TAS: vertex presentation  EFW 3175gm by Leopold's     NST reactive with moderate variability, cat 1  toco ctx 1-2minutes    Discussed patient's history and physical exam with Dr Singh and Dr Vergara  31y/o pt 38.4 weeks  presents in labor   Plan:   -Admit l&d. Routine labs  -Expectant management of labor  -Fetus: cat 1 tracing, vertex presentation, 3175gm, continuos monitoring  -GBS negative   -Pain: patient requesting epidural   -Covid 19 pending   -Repeat QuantiFeron for indeterminate QuantiFeron on 10/02/2020

## 2020-10-16 NOTE — DISCHARGE NOTE OB - CARE PROVIDERS DIRECT ADDRESSES
[email or web address]

## 2020-10-16 NOTE — OB PROVIDER TRIAGE NOTE - HISTORY OF PRESENT ILLNESS
29 y/o pt 38.4 weeks  presents to triage with c/o painful contractions every 3-4 minutes. pt denies bleeding and LOF. pt denies n/v/d, fever or chills. pt endorses +fetal movement.   AP uncomplicated thus far    NKDA  PMH:   PSH:   OB:   GYN:   Social hx:   Medications: 31 y/o pt 38.4 weeks  presents to triage with c/o painful contractions every 3-4 minutes. pt denies bleeding and LOF. pt denies n/v/d, fever or chills. pt endorses +fetal movement.   AP uncomplicated thus far    NKDA  PMH: denies  PSH: right ovarian laproscopic cystectomy 02/15/2019  OB:    08/15/2018 FT 7#  GYN: denies  Social hx: denies   Medications: PNV

## 2020-10-16 NOTE — OB PROVIDER DELIVERY SUMMARY - NSPROVIDERDELIVERYNOTE_OBGYN_ALL_OB_FT
Attending Note   PT progressed to 10/100/+2   Pushed vertex over intact perineum followed by the body and shoulders  Delayed cord clamping   Placenta delivered intact  vagina, rectum and cervix inspected, 2nd degree laceration noted  repaired in usual fashion   rectal exam intact after delivery   Boy for circ  apgars 9/9     R Elisa MANNING

## 2020-10-16 NOTE — OB PROVIDER H&P - HISTORY OF PRESENT ILLNESS
31 y/o pt 38.4 weeks  presents to triage with c/o painful contractions every 3-4 minutes. pt denies bleeding and LOF. pt denies n/v/d, fever or chills. pt endorses +fetal movement.   AP uncomplicated thus far    NKDA  PMH: denies  PSH: right ovarian laproscopic cystectomy 02/15/2019  OB:    08/15/2018 FT 7#  GYN: denies  Social hx: denies   Medications: PNV

## 2020-10-16 NOTE — OB RN DELIVERY SUMMARY - NS_SEPSISRSKCALC_OBGYN_ALL_OB_FT
EOS calculated successfully. EOS Risk Factor: 0.5/1000 live births ([facility] [place] national incidence); GA=38w4d; Temp=98.24; ROM=0.333; GBS='Negative'; Antibiotics='No antibiotics or any antibiotics < 2 hrs prior to birth'

## 2020-10-16 NOTE — DISCHARGE NOTE OB - CARE PLAN
Principal Discharge DX:	 (normal spontaneous vaginal delivery)  Goal:	return to normal health  Assessment and plan of treatment:	nothing in the vagina x 6 weeks

## 2020-10-16 NOTE — OB PROVIDER TRIAGE NOTE - NSHPPHYSICALEXAM_GEN_ALL_CORE
Vital Signs Last 24 Hrs  T(C): 36.6 (16 Oct 2020 04:49), Max: 36.6 (16 Oct 2020 04:44)  T(F): 97.9 (16 Oct 2020 04:49), Max: 97.9 (16 Oct 2020 04:49)  HR: 95 (16 Oct 2020 04:49) (95 - 95)  BP: 118/74 (16 Oct 2020 04:49) (118/74 - 118/74)  BP(mean): --  RR: 17 (16 Oct 2020 04:49) (17 - 17)  SpO2: --    General: A&O x3  ABdomen: soft, non tender  SVE: 6-7/80/-3  TAS: vertex presentation  EFW 3175gm by Leopold's     NST reactive with moderate variability, cat 1  toco ctx 1-2minutes

## 2020-10-16 NOTE — DISCHARGE NOTE OB - CARE PROVIDER_API CALL
Yenny Hopper  OBSTETRICS AND GYNECOLOGY  [address], [address]  [place] [address]  Phone: [phone]  Fax: [phone]  Follow Up Time:

## 2020-10-17 VITALS
DIASTOLIC BLOOD PRESSURE: 63 MMHG | SYSTOLIC BLOOD PRESSURE: 105 MMHG | HEART RATE: 68 BPM | OXYGEN SATURATION: 98 % | RESPIRATION RATE: 18 BRPM | TEMPERATURE: 98 F

## 2020-10-17 RX ADMIN — Medication 975 MILLIGRAM(S): at 10:30

## 2020-10-17 RX ADMIN — Medication 975 MILLIGRAM(S): at 09:45

## 2020-10-17 RX ADMIN — Medication 600 MILLIGRAM(S): at 05:36

## 2020-10-17 RX ADMIN — Medication 600 MILLIGRAM(S): at 00:02

## 2020-10-17 RX ADMIN — Medication 600 MILLIGRAM(S): at 06:36

## 2020-10-17 RX ADMIN — SODIUM CHLORIDE 3 MILLILITER(S): 9 INJECTION INTRAMUSCULAR; INTRAVENOUS; SUBCUTANEOUS at 05:40

## 2020-10-17 RX ADMIN — MAGNESIUM HYDROXIDE 30 MILLILITER(S): 400 TABLET, CHEWABLE ORAL at 05:36

## 2020-10-17 RX ADMIN — Medication 600 MILLIGRAM(S): at 01:02

## 2020-10-17 NOTE — PROGRESS NOTE ADULT - ASSESSMENT
A/P: 29yo PPD#1 s/p .  Patient is stable and doing well post-partum.   - Pain well controlled, continue current pain regimen  - Increase ambulation, SCDs when not ambulating  - Continue regular diet  -Discharge planning    E Brenda MANNING A/P: 31yo PPD#1 s/p .  Patient is stable and doing well post-partum.   -Quant gold indeterminate in pre- labs:  chest x-ray negative  - Pain well controlled, continue current pain regimen  - Increase ambulation, SCDs when not ambulating  - Continue regular diet  -Discharge planning    ROSALES Gutierrez MD

## 2020-10-17 NOTE — PROGRESS NOTE ADULT - SUBJECTIVE AND OBJECTIVE BOX
OB Attending Progress Note:  PPD#1    S: 31yo  PPD#1 s/p . Patient feels well. Pain is well controlled. She is tolerating a regular diet and passing flatus. She is voiding spontaneously, and ambulating without difficulty. Denies CP/SOB. Denies lightheadedness/dizziness. Denies N/V.    O:  Vitals:  Vital Signs Last 24 Hrs  T(C): 36.3 (17 Oct 2020 05:33), Max: 37.1 (16 Oct 2020 18:10)  T(F): 97.4 (17 Oct 2020 05:33), Max: 98.7 (16 Oct 2020 18:10)  HR: 65 (17 Oct 2020 05:33) (65 - 83)  BP: 101/57 (17 Oct 2020 05:33) (96/51 - 105/60)  BP(mean): --  RR: 16 (17 Oct 2020 05:33) (14 - 16)  SpO2: 100% (17 Oct 2020 05:33) (99% - 100%)    MEDICATIONS  (STANDING):  acetaminophen   Tablet .. 975 milliGRAM(s) Oral <User Schedule>  diphtheria/tetanus/pertussis (acellular) Vaccine (ADAcel) 0.5 milliLiter(s) IntraMuscular once  ibuprofen  Tablet. 600 milliGRAM(s) Oral every 6 hours  prenatal multivitamin 1 Tablet(s) Oral daily  sodium chloride 0.9% lock flush 3 milliLiter(s) IV Push every 8 hours      Labs:  Blood type: B Positive  Rubella IgG: RPR: Negative                          11.3<L>   8.60 >-----------< 266    ( 10-16 @ 05:15 )             34.2<L>                  Physical Exam:  General: NAD  Abdomen: soft, non-tender, non-distended, fundus firm  Vaginal: Lochia wnl  Extremities: No erythema/edema, no calf tenderness b/l

## 2020-10-18 LAB
GAMMA INTERFERON BACKGROUND BLD IA-ACNC: 0.02 IU/ML — SIGNIFICANT CHANGE UP
M TB IFN-G BLD-IMP: NEGATIVE — SIGNIFICANT CHANGE UP
M TB IFN-G CD4+ BCKGRND COR BLD-ACNC: -0.01 IU/ML — SIGNIFICANT CHANGE UP
M TB IFN-G CD4+CD8+ BCKGRND COR BLD-ACNC: -0.01 IU/ML — SIGNIFICANT CHANGE UP
QUANT TB PLUS MITOGEN MINUS NIL: 0.79 IU/ML — SIGNIFICANT CHANGE UP

## 2021-06-28 ENCOUNTER — TRANSCRIPTION ENCOUNTER (OUTPATIENT)
Age: 31
End: 2021-06-28

## 2021-06-28 ENCOUNTER — APPOINTMENT (OUTPATIENT)
Dept: INTERNAL MEDICINE | Facility: CLINIC | Age: 31
End: 2021-06-28
Payer: COMMERCIAL

## 2021-06-28 VITALS
HEIGHT: 64 IN | WEIGHT: 108 LBS | BODY MASS INDEX: 18.44 KG/M2 | OXYGEN SATURATION: 98 % | DIASTOLIC BLOOD PRESSURE: 80 MMHG | SYSTOLIC BLOOD PRESSURE: 110 MMHG | HEART RATE: 71 BPM

## 2021-06-28 DIAGNOSIS — Z86.69 PERSONAL HISTORY OF OTHER DISEASES OF THE NERVOUS SYSTEM AND SENSE ORGANS: ICD-10-CM

## 2021-06-28 DIAGNOSIS — H81.10 BENIGN PAROXYSMAL VERTIGO, UNSPECIFIED EAR: ICD-10-CM

## 2021-06-28 DIAGNOSIS — Z83.3 FAMILY HISTORY OF DIABETES MELLITUS: ICD-10-CM

## 2021-06-28 DIAGNOSIS — Z78.9 OTHER SPECIFIED HEALTH STATUS: ICD-10-CM

## 2021-06-28 DIAGNOSIS — Z00.00 ENCOUNTER FOR GENERAL ADULT MEDICAL EXAMINATION W/OUT ABNORMAL FINDINGS: ICD-10-CM

## 2021-06-28 DIAGNOSIS — Z80.3 FAMILY HISTORY OF MALIGNANT NEOPLASM OF BREAST: ICD-10-CM

## 2021-06-28 PROCEDURE — 99072 ADDL SUPL MATRL&STAF TM PHE: CPT

## 2021-06-28 PROCEDURE — 99385 PREV VISIT NEW AGE 18-39: CPT | Mod: 25

## 2021-06-28 PROCEDURE — 83036 HEMOGLOBIN GLYCOSYLATED A1C: CPT | Mod: QW

## 2021-06-28 PROCEDURE — G0442 ANNUAL ALCOHOL SCREEN 15 MIN: CPT

## 2021-06-28 PROCEDURE — 36415 COLL VENOUS BLD VENIPUNCTURE: CPT

## 2021-06-28 RX ORDER — NORETHINDRONE 0.35 MG/1
0.35 TABLET ORAL
Refills: 0 | Status: ACTIVE | COMMUNITY

## 2021-06-28 NOTE — ASSESSMENT
[FreeTextEntry1] : 31F presents for visit to establish care with new PMD\par \par 1. HCM\par -cervical cancer screening 03/21\par -HIV screening/HCV screening declined. \par -tdap - 2016\par -COVID vaccine - january pfizer\par \par 2. Migraine; possibly with aura\par -no prior hx or dx with aura\par -happened on one occurrence only\par -continue to monitor and if happens again may refer to neurology\par -ocp is progesterone only mini pill\par \par RTC 1 year or sooner if needed

## 2021-06-28 NOTE — HISTORY OF PRESENT ILLNESS
[FreeTextEntry1] : Visit to establish care with new primary care doctor\par  [de-identified] : \par 31F presents for visit to establish care with new PMD\par \par Few days ago waking up with dizziness and did Epley maneuver and that helped\par Migraine yesterday and  it went away with tylenol. \par \par PMH: none\par PSH: ovarian cyst removal\par FH: heart disease on dad's side. no FH of colon cancer. mother had breast cancer s/p RT and lumpectomy. mom has thalassemia minor. father has diabetes. \par SH: see below\par \par Medications: birth control\par Allergies: none\par

## 2021-06-28 NOTE — PHYSICAL EXAM
[No Acute Distress] : no acute distress [Well Nourished] : well nourished [Well Developed] : well developed [Well-Appearing] : well-appearing [Normal Sclera/Conjunctiva] : normal sclera/conjunctiva [PERRL] : pupils equal round and reactive to light [EOMI] : extraocular movements intact [Normal Outer Ear/Nose] : the outer ears and nose were normal in appearance [Normal Oropharynx] : the oropharynx was normal [No JVD] : no jugular venous distention [No Lymphadenopathy] : no lymphadenopathy [Supple] : supple [Thyroid Normal, No Nodules] : the thyroid was normal and there were no nodules present [No Respiratory Distress] : no respiratory distress  [No Accessory Muscle Use] : no accessory muscle use [Clear to Auscultation] : lungs were clear to auscultation bilaterally [Normal Rate] : normal rate  [Regular Rhythm] : with a regular rhythm [Normal S1, S2] : normal S1 and S2 [No Murmur] : no murmur heard [No Carotid Bruits] : no carotid bruits [No Abdominal Bruit] : a ~M bruit was not heard ~T in the abdomen [No Varicosities] : no varicosities [Pedal Pulses Present] : the pedal pulses are present [No Edema] : there was no peripheral edema [No Palpable Aorta] : no palpable aorta [No Extremity Clubbing/Cyanosis] : no extremity clubbing/cyanosis [Soft] : abdomen soft [Non Tender] : non-tender [Non-distended] : non-distended [No Masses] : no abdominal mass palpated [No HSM] : no HSM [Normal Bowel Sounds] : normal bowel sounds [Normal Posterior Cervical Nodes] : no posterior cervical lymphadenopathy [Normal Anterior Cervical Nodes] : no anterior cervical lymphadenopathy [No CVA Tenderness] : no CVA  tenderness [No Spinal Tenderness] : no spinal tenderness [No Joint Swelling] : no joint swelling [Grossly Normal Strength/Tone] : grossly normal strength/tone [No Rash] : no rash [Coordination Grossly Intact] : coordination grossly intact [No Focal Deficits] : no focal deficits [Normal Gait] : normal gait [Normal Affect] : the affect was normal [Normal Insight/Judgement] : insight and judgment were intact [Declined Breast Exam] : declined breast exam  [Normal Supraclavicular Nodes] : no supraclavicular lymphadenopathy [Normal] : coordination was normal [Coordination - Dysmetria Impaired Finger-to-Nose Bilateral] : not present

## 2021-06-28 NOTE — REVIEW OF SYSTEMS
[Fever] : no fever [Chills] : no chills [Vision Problems] : no vision problems [Hearing Loss] : no hearing loss [Chest Pain] : no chest pain [Shortness Of Breath] : no shortness of breath [Abdominal Pain] : no abdominal pain [Dysuria] : no dysuria [Joint Pain] : no joint pain [Skin Rash] : no skin rash [Headache] : no headache [Suicidal] : not suicidal [Easy Bleeding] : no easy bleeding

## 2021-06-28 NOTE — HEALTH RISK ASSESSMENT
[Good] : ~his/her~  mood as  good [Yes] : Yes [Monthly or less (1 pt)] : Monthly or less (1 point) [1 or 2 (0 pts)] : 1 or 2 (0 points) [Never (0 pts)] : Never (0 points) [No] : In the past 12 months have you used drugs other than those required for medical reasons? No [0] : 2) Feeling down, depressed, or hopeless: Not at all (0) [Patient reported PAP Smear was normal] : Patient reported PAP Smear was normal [None] : None [With Family] : lives with family [] :  [Sexually Active] : sexually active [Feels Safe at Home] : Feels safe at home [Fully functional (bathing, dressing, toileting, transferring, walking, feeding)] : Fully functional (bathing, dressing, toileting, transferring, walking, feeding) [Fully functional (using the telephone, shopping, preparing meals, housekeeping, doing laundry, using] : Fully functional and needs no help or supervision to perform IADLs (using the telephone, shopping, preparing meals, housekeeping, doing laundry, using transportation, managing medications and managing finances) [Smoke Detector] : smoke detector [Carbon Monoxide Detector] : carbon monoxide detector [Seat Belt] :  uses seat belt [Sunscreen] : uses sunscreen [] : No [Audit-CScore] : 1 [de-identified] : running around after kids, not really dedicated exercise time [de-identified] : diet is "normal" lots of fruits and vegetables.  [VNH4Hiskb] : 0 [HIV test declined] : HIV test declined [Hepatitis C test declined] : Hepatitis C test declined [High Risk Behavior] : no high risk behavior [Reports changes in hearing] : Reports no changes in hearing [Reports changes in vision] : Reports no changes in vision [Guns at Home] : no guns at home [PapSmearDate] : 03/2021 [de-identified] :  and 2 kids [de-identified] : little bit of blurry vision before migraine. saw maybe a wavyness/blurriness lasted 10 mins in AM. headache started right after.

## 2021-06-29 ENCOUNTER — TRANSCRIPTION ENCOUNTER (OUTPATIENT)
Age: 31
End: 2021-06-29

## 2021-06-29 LAB
ALBUMIN SERPL ELPH-MCNC: 4.6 G/DL
ALP BLD-CCNC: 70 U/L
ALT SERPL-CCNC: 11 U/L
ANION GAP SERPL CALC-SCNC: 11 MMOL/L
AST SERPL-CCNC: 16 U/L
BASOPHILS # BLD AUTO: 0.07 K/UL
BASOPHILS NFR BLD AUTO: 1.2 %
BILIRUB SERPL-MCNC: 0.3 MG/DL
BUN SERPL-MCNC: 18 MG/DL
CALCIUM SERPL-MCNC: 9.2 MG/DL
CHLORIDE SERPL-SCNC: 102 MMOL/L
CHOLEST SERPL-MCNC: 175 MG/DL
CO2 SERPL-SCNC: 25 MMOL/L
CREAT SERPL-MCNC: 0.79 MG/DL
EOSINOPHIL # BLD AUTO: 0.17 K/UL
EOSINOPHIL NFR BLD AUTO: 2.9 %
ESTIMATED AVERAGE GLUCOSE: 105 MG/DL
GLUCOSE SERPL-MCNC: 96 MG/DL
HBA1C MFR BLD HPLC: 5.3 %
HCT VFR BLD CALC: 39.3 %
HDLC SERPL-MCNC: 76 MG/DL
HGB BLD-MCNC: 12.6 G/DL
IMM GRANULOCYTES NFR BLD AUTO: 0.2 %
LDLC SERPL CALC-MCNC: 87 MG/DL
LYMPHOCYTES # BLD AUTO: 1.49 K/UL
LYMPHOCYTES NFR BLD AUTO: 25.4 %
MAN DIFF?: NORMAL
MCHC RBC-ENTMCNC: 31 PG
MCHC RBC-ENTMCNC: 32.1 GM/DL
MCV RBC AUTO: 96.8 FL
MONOCYTES # BLD AUTO: 0.53 K/UL
MONOCYTES NFR BLD AUTO: 9 %
NEUTROPHILS # BLD AUTO: 3.6 K/UL
NEUTROPHILS NFR BLD AUTO: 61.3 %
NONHDLC SERPL-MCNC: 99 MG/DL
PLATELET # BLD AUTO: 329 K/UL
POTASSIUM SERPL-SCNC: 3.9 MMOL/L
PROT SERPL-MCNC: 7.2 G/DL
RBC # BLD: 4.06 M/UL
RBC # FLD: 12 %
SODIUM SERPL-SCNC: 138 MMOL/L
TRIGL SERPL-MCNC: 61 MG/DL
WBC # FLD AUTO: 5.87 K/UL

## 2021-11-03 ENCOUNTER — APPOINTMENT (OUTPATIENT)
Dept: OBGYN | Facility: CLINIC | Age: 31
End: 2021-11-03
Payer: COMMERCIAL

## 2021-11-03 VITALS
WEIGHT: 107 LBS | SYSTOLIC BLOOD PRESSURE: 126 MMHG | BODY MASS INDEX: 17.83 KG/M2 | HEIGHT: 65 IN | DIASTOLIC BLOOD PRESSURE: 78 MMHG

## 2021-11-03 DIAGNOSIS — Z30.019 ENCOUNTER FOR INITIAL PRESCRIPTION OF CONTRACEPTIVES, UNSPECIFIED: ICD-10-CM

## 2021-11-03 PROCEDURE — 99385 PREV VISIT NEW AGE 18-39: CPT

## 2021-11-03 RX ORDER — NORGESTIMATE AND ETHINYL ESTRADIOL 0.25-0.035
0.25-35 KIT ORAL DAILY
Qty: 3 | Refills: 3 | Status: ACTIVE | COMMUNITY
Start: 2021-11-03 | End: 1900-01-01

## 2021-11-03 NOTE — PLAN
[FreeTextEntry1] : #HCM\par -Breast self exam reviewed and taught\par -STI Screening declined\par -Pap/HPV today\par -Immunizations: UTD\par \par #contraception\par -Discussed LARCS and OCPS\par -Pt would like COCs at this time. Sprintec Rx\par \par #h/o ovarian cysts\par -asymptomatic\par -TVUS to re-eval ovaries\par \par RTO in 1 year for annual GYN exam or PRN for any GYN complaints\par PRATIK Chaudhari MD\par

## 2021-11-03 NOTE — HISTORY OF PRESENT ILLNESS
[Normal Amount/Duration] :  normal amount and duration [Regular Cycle Intervals] : periods have been regular [Menarche Age: ____] : age at menarche was [unfilled] [Yes] : Patient has concerns regarding sex [Currently Active] : currently active [Men] : men [No] : No [Patient refuses STI testing] : Patient refuses STI testing [FreeTextEntry1] : pain with initial penetration-increase lubrication during sex.  [FreeTextEntry3] : micronor

## 2021-11-09 LAB
CYTOLOGY CVX/VAG DOC THIN PREP: NORMAL
HPV HIGH+LOW RISK DNA PNL CVX: NOT DETECTED

## 2021-12-06 ENCOUNTER — APPOINTMENT (OUTPATIENT)
Dept: OBGYN | Facility: CLINIC | Age: 31
End: 2021-12-06

## 2022-05-26 NOTE — OB PROVIDER DELIVERY SUMMARY - NSPLACINTACT_OBGYN_ALL_OB
Yes [Home] : at home, [unfilled] , at the time of the visit. [Other Location: e.g. Home (Enter Location, City,State)___] : at [unfilled] [Initial - Scheduled] : [unfilled]  is being seen for  ~M an initial scheduled visit [Parents] : parents [FreeTextEntry3] : for FTT and developmental delays in this 6 month old female. Genetic counselor, Juany Rosenberg, was present for the evaluation.\par

## 2022-09-17 ENCOUNTER — RX RENEWAL (OUTPATIENT)
Age: 32
End: 2022-09-17

## 2022-09-17 DIAGNOSIS — Z30.9 ENCOUNTER FOR CONTRACEPTIVE MANAGEMENT, UNSPECIFIED: ICD-10-CM

## 2022-09-17 RX ORDER — NORGESTIMATE AND ETHINYL ESTRADIOL 0.25-0.035
0.25-35 KIT ORAL DAILY
Qty: 3 | Refills: 0 | Status: ACTIVE | COMMUNITY
Start: 2022-09-17 | End: 1900-01-01

## 2022-11-04 ENCOUNTER — APPOINTMENT (OUTPATIENT)
Dept: OBGYN | Facility: CLINIC | Age: 32
End: 2022-11-04

## 2022-11-04 ENCOUNTER — NON-APPOINTMENT (OUTPATIENT)
Age: 32
End: 2022-11-04

## 2022-11-04 VITALS
BODY MASS INDEX: 17.49 KG/M2 | HEIGHT: 65 IN | WEIGHT: 105 LBS | SYSTOLIC BLOOD PRESSURE: 103 MMHG | DIASTOLIC BLOOD PRESSURE: 69 MMHG

## 2022-11-04 DIAGNOSIS — Z01.419 ENCOUNTER FOR GYNECOLOGICAL EXAMINATION (GENERAL) (ROUTINE) W/OUT ABNORMAL FINDINGS: ICD-10-CM

## 2022-11-04 PROCEDURE — 99395 PREV VISIT EST AGE 18-39: CPT

## 2022-11-04 RX ORDER — NORETHINDRONE ACETATE AND ETHINYL ESTRADIOL AND FERROUS FUMARATE 1MG-20(21)
1-20 KIT ORAL DAILY
Qty: 3 | Refills: 3 | Status: ACTIVE | COMMUNITY
Start: 2022-11-04 | End: 1900-01-01

## 2022-11-05 LAB — HPV HIGH+LOW RISK DNA PNL CVX: NOT DETECTED

## 2022-11-13 LAB — CYTOLOGY CVX/VAG DOC THIN PREP: NORMAL

## 2022-11-16 NOTE — ASU PREOP CHECKLIST - STERILIZATION AFFIRMATION
Video-Visit Details    Type of service:  Video Visit    Video Start Time (time video started): 3:00    Video End Time (time video stopped): 3:30    Originating Location (pt. Location): Home        Distant Location (provider location):  Off-site    Mode of Communication:  Video Conference via [facility]    NUTRITION REASSESSMENT NOTE (VIDEO VISIT DUE TO COVID-19)  REASON FOR ASSESSMENT  Angy QUINTANA Eduardodeann referred by Dr. Lira for MNT related to  Obesity, Class III, BMI 40-49.9 (morbid obesity) (H) [E66.01]          Patient accompanied by self      NUTRITION HISTORY  Patient continues with multiple life changes and stressors.  Patient aware that she likes to self soothe with ice cream which she has recently increased.  Patient is aware that social cues trigger her to eat. Patient's spouse recently has weight loss surgery so there is adjust with eating in the home.      5/19 Patient walks 30 minutes most days + weekend 60 minute exercise (biking or hiking); lower back pain -foot going numb when walking and pain at night; dessert (3 days no dessert); asking self if hungry.  Patient made Malt o Meal as substitute for dessert.  Patient limiting dessert if eating dinner later or brushing teeth right after dinner and tracking dessert on calendar.  Patient had 4 day celebration for her birthday and felt was able to make wise choices.  Patient made cream cheese brownies for birthday dessert and froze remaining dessert. Patient continues to navigate new eating regimen with spouses's weight loss surgery.      7/14/2021 Success-taking walking routes with hills.   Evening meals consisting of grilled chicken/pork loin, grilled sweet potatoes and 2 vegetables   Patient having ice cream thoughts in afternoon-to self soothe.  Patient practicing distractions to avoid eating ice cream at nigt (brushing teeth, asking self if hungry, substituted Malt 0 Meal, mineral water, too tired).       9/8/2021 Patient walking daily + 60 minutes 1  time per week.  Did well at [facility]. Hiking 1-4.6 miles. Weight loss during trip-308 lbs. Today 310 lbs. Grilled meat and vegetable + fruit. 1-5 days per week with no desserts.   Breakfast: yogurt + 1/2 cup granola + fruit   Lunch: leftovers (meat + vegetables) or cheese and Triscuits + vegetables + fruit   Dinner: BLT (3 slices sierra) + 2 corn on the cob + watermelon + tomatoes; tacos + watermelon/strawberries + cucumbers      12/13/2021 No desserts 4 times per week -met; emotional eating and Thanksgiving desserts where times when increased desserts. Patient baked cookies-peanut butter blossom 1 batch (2 servings raw dough + 1 cookie) and froze them.  Patient walking outside and would like to walk indoors on treadmill.  Patient and spouse alter meal planning.       1/24/2021 Dessert (4 days) past 2 weeks; walking regularly-movement in the house if not able to walk outside; Weight-1/17 307.2 lbs  Patient continues to work remote. Patient tracking dessert intake.      3/7/2022 Self soothe in evening snack-made tea or tells self ate late dinner and does not need snack.  Patient asking self if physically hungry.  Patient drinking fizzy water or malt o meal as evening snack.  Patient aware she has been stress eating.  Tracking when not having dessert -identifying why eating  Exercise-cross country skiing; mall walk; snow shoeing; treadmill 2 times haring treadmill   Yogurt; cheese and crackers; cereal      Patient wants to increase exercise time as will be going to [place] weekend ([place]) [facility]      4/25/2002 Patient able to get 45-60 minutes exercise on weekends and 20-30 minutes of exercise daily.  Patient wants to lose 5 lbs prior to trip to [place].  Patient was able to have 3 days no desserts and then 1 week no dessert. 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  Patient determined ways she could lose weight: Dessert 2 times per week, add  treadmill, eliminate morning snack, 1 snack at 3 PM. Alcohol 1 time per week, no caramel lattes, food is fuel, it's too late to be hungry, spouse brings sweet treats without asking, tea/bath/shower      6/15/2022 Patient skips dessert if eats late dinner.  Patient felt discouraged with weight loss as wanted to lose more weight (down 3.5 lbs).  Patient wants weight loss for functional reasons.  Patient signed [place].  Patient has increased cravings and thoughts about ice cream.  Shift in relationship with spouse after spouse's weight loss surgery.      7/27/2022 Patient increased exercise-[place] with [employer]; started the last week in June for bike rides (4 rides different times in 2 weeks-9 miles).  Rode in 18 mile race.  Went hiking on vacation[place], [place].     No dessert (hit or miss) 50% 3 days no dessert; ice cream and s'mores on vacation     Typical intake:  Greek yogurt + 1/4 cup granola + fruit + 1/2 and 1/2 with coffee or english muffin with peanut butter/butter and strawberry jam or eggs scrambled or hard boiled  Leftovers -1 cup cherries; cherry tomatoes; 1/2 cup pea pods + 3 oz pork tenderloin (teryaki)   Watermelon, pork tenderloin, 1 cup rice + tomatoes/pea pods; cheeseburger with whole wheat bun + vegetables + watermelon   Snacks: croissant ([place]ust[place]) Yazdanism oat squares (1 cup dry) (3:00 PM) cheese and crackers    Beverages: coffee; water; fizzy water      Emotional eating at night-ice cream      9/21/2022 Patient has been able to bike 2 times per week.  Patient's weight has been stable.  Patient has not been able to track dinner and snacks.  Stress eating ebbs and flows.  Trying to save dessert for the weekend or may self soothe during th week with ice cream.  Patient stress eating in the evening and may boredom eat prior to lunch (cereal).    Weight: 308.4 lbs       11/16/2022 Biking 2 times per week. Tour biking race: [employer] 14  "miles (rode by herself). [place] 12 mile hilly bike ride. (rode with friend).  Patient has consistently walked dog and completes PT exercises.  Patient determining plan for winter exercise.    314.4 lbs (food and sleep changes) Patient was eating more sweets as patient's wife baked sweets this fall (3-4 weeks with increase of sweets).  Holiday baking plan consists of 5 cookies and 1 candy (peanut brittle).  Ice cream consumption has increased recently.     DIET RECALL   Breakfast: cereal and banana; yogurt + granola (measures), fruit + coffee; 2 slices toast with butter + 1/2 cup cottage cheese    Lunch: leftovers (includes fruit and vegetable daily)  Snack: fruit or cheese with lower salt nut thin crackers   Dinner: Tuna steak, Asian marinade, stir gomez, baked vegetable + brown rice (3/4 cup) + canned peaches; chicken thighs with beans and rice; sheet pan dinner with chicken and vegetables or grilled salmon and grilled vegetables; taco; protein + fruit and vegetable  Snacks: fruit, string cheese, Ritz crackers or yogurt; dessert in the evening (ice cream recently)  Beverages: mainly water, coffee, mineral water  Dining out: none currently  Exercise: Patient exercising 5-6 times per week. (cross country skiing, snow shoeing, walking and walking dog).  Patient exercises 1 hour on weekends (hiking or walking).     MEDICATIONS:  Reviewed      ANTHROPOMETRICS  Height: 5'8\" (note height change in chart previously 5'9\")  Weight: 314 lbs per patient   BMI (kg/m2): 47.7 kg/m2   %IBW: 224%  Weight History:   Wt Readings from Last 10 Encounters:   10/04/22 138.1 kg (304 lb 6.4 oz)   07/27/22 139.7 kg (308 lb)   05/16/22 142.1 kg (313 lb 3.2 oz)   01/12/22 139.7 kg (308 lb)   12/22/21 137.4 kg (303 lb)   10/25/21 137.6 kg (303 lb 4.8 oz)   10/22/21 139.7 kg (308 lb)   10/08/21 139.9 kg (308 lb 6.4 oz)   10/08/21 139.7 kg (308 lb)   08/27/21 139.3 kg (307 lb)      ASSESSED NUTRITION NEEDS  Estimated Energy Needs: " 9823-0717 kcals (15-20 kcals/kg) for gradual weight loss   Estimated Protein Needs: 66-79 (1-1.2 gm/kg IBW) for repletion with exercise  RMR: 1440     EVALUATION/PROGRESS TOWARDS GOALS   Previous goals:  Consume special desserts 3 times per week -not met   Bike 2 times per week -met      Previous Nutrition Diagnosis: Obesity related to excess energy intake as evidenced by BMI of 46.8 kg/m2-no change     Current Nutrition Diagnosis:Obesity related to excess energy intake as evidenced by BMI of 47.7 kg/m2     INTERVENTIONS  Nutrition Prescription   Recommend exercise 5 times per week; track intake if eating mindlessly; increase fiber to 25-30 grams per day      Implementation:   Meals and Snacks: Continue eating 3 meals + deliberate snacking (if hungry)   Nutrition Education (Content):              a)  Discussed progress towards goals.  Congratulated patient for awareness of emotional eating.  Reviewed current food and eating behavior challenges.  Patient continues with life stressors which can affect evening eating.  Discussed self care as to impact emotional eating.  Discussed holiday eating plan.  Supported patient in her struggle with food and weight.    Nutrition Education (Application):              a) Discussed emotional eating in the evening.  Reviewed alternatives to emotional eating that patient determined previously.              b) Patient verbalized understanding of diet by stating will have freeze holiday cookies.   Expected patient engagement: good     CURRENT GOALS   Exercise 2 times per week on indoor equipment   Freeze holiday cookies (Allow 2 days of fresh cookies day 1 and 2nd day)    FOLLOW UP/MONITORING    Patient to follow up in 8 weeks  Patient has RD contact information      Time spent with patient: 30 minutes     Mattie Amaya, RD, LD  [facility] Outpatient Dietitian  [phone] (office phone)          n/a

## 2023-02-06 ENCOUNTER — APPOINTMENT (OUTPATIENT)
Dept: OBGYN | Facility: CLINIC | Age: 33
End: 2023-02-06

## 2023-06-06 NOTE — H&P PST ADULT - NS MD HP INPLANTS MED DEV
Writer returned pts voicemail to schedule appointment. LVM for pt to call back & get scheduled.
None

## 2023-07-06 NOTE — OB PROVIDER H&P - NSCHILDCOND1_OBGYN_ALL_OB

## 2023-10-19 NOTE — OB PROVIDER TRIAGE NOTE - NSOBPROC_OBGYN_ALL_OB
Annual Physical Exam      CHIEF COMPLAINT:    Chief Complaint   Patient presents with   • Physical   • Back Problem       HISTORY OF PRESENT ILLNESS:  The patient is a 44 year old female who presents for annual physical.      #post-op hypothyroidism  Recent TSH and Free T4 normal  Synthroid 100 and 150 mg    #daily low back pain going to left leg, worse over the past year.  Using tylenol, heating pad. Tried ibuprofen, which gave her GI upset and constipation.    #HTN   Amlodipine 10 mg daily    Still working at [employer].     Eye exam within last 1 year?  no  Tetanus vaccine in last 10 years?  yes  Influenza vaccine?  no  Covid vaccine? no    REVIEW OF SYSTEMS:    CONSTITUTIONAL:  No Fevers, Chills, Weight loss, Fatigue  HEAD/EARS/NOSE/THROAT/MOUTH:  No Headache, Tinnitus, Hearing changes, Ear pain, Ear discharge, Nasal congestion, Nasal discharge, Sinus Pain, Sore throat  RESPIRATORY:  No Cough, Sputum, Hemoptysis, Shortness of breath, Wheezing  CARDIOVASCULAR:  No Chest pain, Edema, Syncope, Palpitations, Diaphoresis, Lightheadedness  GASTROINTESTINAL:  No GERD, Abdominal pain, Nausea, Vomiting, Diarrhea, Constipation, Melena, Bright red blood per rectum  GENITOURINARY:  No dysuria, urinary frequency, hesitancy.  MUSCULOSKELETAL:  See HPI  NEUROLOGICAL:  No Numbness, Tingling, Weakness  PSYCHIATRIC:  No Depression, Anxiety, Panic attacks    Past Medical History:  Past Medical History:   Diagnosis Date   • RAD (reactive airway disease)    • Vaginal delivery 1997, 2007   • Varicella without mention of complication     childhood       Past Surgical History:  Past Surgical History:   Procedure Laterality Date   • Pelvis laparoscopy,dx  07/08/2005    Laparoscopy: right partial salpingectomy, ectopic pregnancy   • Thyroidectomy=substernal,transcerv  11/11/2021    Dr. Ruff       Current Medications:  Current Outpatient Medications   Medication Sig Dispense Refill   • diclofenac (VOLTAREN) 1 % gel Apply 4 g topically  4 times daily as needed (pain). Apply to knees 150 g 1   • cyclobenzaprine (FLEXERIL) 5 MG tablet Take 1 tablet by mouth 3 times daily as needed for Muscle spasms. 60 tablet 1   • levothyroxine 150 MCG tablet Take 1 tablet Monday - Friday. 90 tablet 1   • levothyroxine 100 MCG tablet Take 1 tablet by mouth 2 days a week. On Saturday and Saturday. 90 tablet 1   • indomethacin (INDOCIN) 50 MG capsule Take 1 capsule by mouth in the morning and 1 capsule in the evening. Take with meals. 60 capsule 0   • amLODIPine (NORVASC) 10 MG tablet Take 1 tablet by mouth daily. (Patient taking differently: Take 10 mg by mouth every evening.) 90 tablet 1   • cetirizine (ZyrTEC Allergy) 10 MG tablet Take 1 tablet by mouth daily. 30 tablet 5   • citalopram (CeleXA) 20 MG tablet Take 1 tablet by mouth daily. For anxiety 30 tablet 1     No current facility-administered medications for this visit.       Allergies:  ALLERGIES:  No Known Allergies    SOCIAL HISTORY:  Social History     Tobacco Use   • Smoking status: Former     Current packs/day: 0.00     Types: Cigarettes     Quit date: 2021     Years since quittin.2   • Smokeless tobacco: Never   • Tobacco comments:     started smoking age 18, 1-2 cigarettes per day   Vaping Use   • Vaping Use: never used   Substance Use Topics   • Alcohol use: Not Currently     Alcohol/week: 0.0 - 1.0 standard drinks of alcohol   • Drug use: Yes     Frequency: 5.0 times per week     Types: Marijuana         Drug Use:    Yes           Frequency: 5    per week       Special: Marijuana      FAMILY HISTORY:  Family History   Problem Relation Age of Onset   • Stroke Mother    • Diabetes Father    • Hypertension Father    • Cancer, Breast Paternal Grandmother 42   • Dementia/Alzheimers Maternal Grandfather        PHYSICAL EXAM:  Visit Vitals  /80 (BP Location: LUE - Left upper extremity, Patient Position: Sitting, Cuff Size: Large Adult)   Pulse 80   Resp 18   Wt 83 kg (182 lb 14.4 oz)   LMP  10/13/2023 (Exact Date)   BMI 32.40 kg/m²     Constitutional: Appears well-developed and well-nourished. No distress.   Head: Normocephalic and atraumatic.   Eyes: No scleral icterus. EOMI.   Ears: External ears normal. TMs wnl.  Cardiovascular: Normal rate, regular rhythm, normal heart sounds. No murmur heard.  Pulmonary/Chest: Effort normal and breath sounds normal. No wheezes or respiratory distress.  Abdominal: Soft. No tenderness or distension. No masses or hernias, No Rigidity or Guarding  Musculoskeletal: Normal Gait. Moving all extremities. Tenderness on palpation of low back.  Neurological: Alert and oriented.  Skin: Skin is warm and dry.   Psychiatric: Normal mood and affect. Behavior is normal. Normal speech, language, rate, volume and coherence.    DATA:  Reviewed prior labs, imaging, and office/hospital notes    ASSESSMENT AND PLAN:    1. Annual physical exam  Reviewed health maintenance, declined vaccines, orders as below.  - CBC No Differential; Future  - Glycohemoglobin; Future  - Comprehensive Metabolic Panel; Future  - Lipid Panel With Reflex; Future    2. Post-operative hypothyroidism  Stable, cont synthroid, f/u with endo    3. Vitamin D deficiency  - Vitamin D -25 Hydroxy; Future    4. Low back pain radiating to left leg  Uncontrolled, start flexeril, PT, declined nsaid, check Xrays  - cyclobenzaprine (FLEXERIL) 5 MG tablet; Take 1 tablet by mouth 3 times daily as needed for Muscle spasms.  Dispense: 60 tablet; Refill: 1  - XRAY LUMBAR SPINE 2-3V; Future  - SERVICE TO PHYSICAL THERAPY    5. Chronic pain of both knees  Uncontrolled, start voltaren gel, PT, declined nsaid, check Xrays  - diclofenac (VOLTAREN) 1 % gel; Apply 4 g topically 4 times daily as needed (pain). Apply to knees  Dispense: 150 g; Refill: 1  - XR KNEE 2 VIEWS BILATERAL AND AP STANDING BILATERAL; Future  - SERVICE TO PHYSICAL THERAPY    6. Colon cancer screening  - Occult Blood - iFOB (aka FIT); Future    7. Exposure to  communicable disease  - Hepatitis B Surface Antibody; Future    See AVS for patient instructions.    Return in about 2 months (around 12/19/2023) for Follow up.      Jose Strickland MD  Family Medicine   None Done

## 2023-11-07 ENCOUNTER — APPOINTMENT (OUTPATIENT)
Dept: OBGYN | Facility: CLINIC | Age: 33
End: 2023-11-07
Payer: COMMERCIAL

## 2023-11-07 VITALS
HEIGHT: 65 IN | WEIGHT: 110 LBS | DIASTOLIC BLOOD PRESSURE: 75 MMHG | BODY MASS INDEX: 18.33 KG/M2 | SYSTOLIC BLOOD PRESSURE: 120 MMHG

## 2023-11-07 PROCEDURE — 99395 PREV VISIT EST AGE 18-39: CPT

## 2023-11-07 RX ORDER — NORETHINDRONE ACETATE AND ETHINYL ESTRADIOL AND FERROUS FUMARATE 1MG-20(21)
1-20 KIT ORAL DAILY
Qty: 3 | Refills: 3 | Status: ACTIVE | COMMUNITY
Start: 2023-11-07 | End: 1900-01-01

## 2023-11-07 RX ORDER — NORETHINDRONE ACETATE AND ETHINYL ESTRADIOL AND FERROUS FUMARATE 1MG-20(21)
1-20 KIT ORAL DAILY
Qty: 1 | Refills: 0 | Status: ACTIVE | COMMUNITY
Start: 2023-11-07 | End: 1900-01-01

## 2023-11-08 LAB — HPV HIGH+LOW RISK DNA PNL CVX: NOT DETECTED

## 2023-11-13 LAB — CYTOLOGY CVX/VAG DOC THIN PREP: NORMAL

## 2024-11-19 ENCOUNTER — NON-APPOINTMENT (OUTPATIENT)
Age: 34
End: 2024-11-19

## 2024-11-19 ENCOUNTER — APPOINTMENT (OUTPATIENT)
Dept: OBGYN | Facility: CLINIC | Age: 34
End: 2024-11-19
Payer: COMMERCIAL

## 2024-11-19 VITALS
BODY MASS INDEX: 18.16 KG/M2 | WEIGHT: 109 LBS | HEIGHT: 65 IN | DIASTOLIC BLOOD PRESSURE: 78 MMHG | SYSTOLIC BLOOD PRESSURE: 120 MMHG

## 2024-11-19 DIAGNOSIS — Z30.019 ENCOUNTER FOR INITIAL PRESCRIPTION OF CONTRACEPTIVES, UNSPECIFIED: ICD-10-CM

## 2024-11-19 DIAGNOSIS — Z01.411 ENCOUNTER FOR GYNECOLOGICAL EXAMINATION (GENERAL) (ROUTINE) WITH ABNORMAL FINDINGS: ICD-10-CM

## 2024-11-19 DIAGNOSIS — F32.A DEPRESSION, UNSPECIFIED: ICD-10-CM

## 2024-11-19 DIAGNOSIS — Z80.41 FAMILY HISTORY OF MALIGNANT NEOPLASM OF OVARY: ICD-10-CM

## 2024-11-19 DIAGNOSIS — N92.0 EXCESSIVE AND FREQUENT MENSTRUATION WITH REGULAR CYCLE: ICD-10-CM

## 2024-11-19 DIAGNOSIS — Z80.3 FAMILY HISTORY OF MALIGNANT NEOPLASM OF BREAST: ICD-10-CM

## 2024-11-19 DIAGNOSIS — R92.30 DENSE BREASTS, UNSPECIFIED: ICD-10-CM

## 2024-11-19 DIAGNOSIS — Z12.31 ENCOUNTER FOR SCREENING MAMMOGRAM FOR MALIGNANT NEOPLASM OF BREAST: ICD-10-CM

## 2024-11-19 PROCEDURE — 99395 PREV VISIT EST AGE 18-39: CPT

## 2024-11-19 PROCEDURE — 99459 PELVIC EXAMINATION: CPT

## 2024-11-19 PROCEDURE — 99213 OFFICE O/P EST LOW 20 MIN: CPT | Mod: 25

## 2024-11-19 PROCEDURE — G0444 DEPRESSION SCREEN ANNUAL: CPT | Mod: 59

## 2024-11-19 RX ORDER — SERTRALINE 25 MG/1
25 TABLET, FILM COATED ORAL
Qty: 7 | Refills: 0 | Status: COMPLETED | COMMUNITY
Start: 2024-11-19 | End: 2024-11-19

## 2024-11-19 RX ORDER — SERTRALINE HYDROCHLORIDE 50 MG/1
50 TABLET, FILM COATED ORAL DAILY
Qty: 7 | Refills: 0 | Status: COMPLETED | COMMUNITY
Start: 2024-11-19 | End: 2024-11-19

## 2024-11-19 RX ORDER — SERTRALINE HYDROCHLORIDE 100 MG/1
100 TABLET, FILM COATED ORAL DAILY
Qty: 90 | Refills: 3 | Status: COMPLETED | COMMUNITY
Start: 2024-11-19 | End: 2024-11-19

## 2024-11-22 LAB — HPV HIGH+LOW RISK DNA PNL CVX: NOT DETECTED

## 2024-11-27 LAB — CYTOLOGY CVX/VAG DOC THIN PREP: NORMAL

## 2024-12-19 ENCOUNTER — APPOINTMENT (OUTPATIENT)
Dept: ULTRASOUND IMAGING | Facility: IMAGING CENTER | Age: 34
End: 2024-12-19
Payer: COMMERCIAL

## 2024-12-19 ENCOUNTER — OUTPATIENT (OUTPATIENT)
Dept: OUTPATIENT SERVICES | Facility: HOSPITAL | Age: 34
LOS: 1 days | End: 2024-12-19
Payer: COMMERCIAL

## 2024-12-19 ENCOUNTER — RESULT REVIEW (OUTPATIENT)
Age: 34
End: 2024-12-19

## 2024-12-19 ENCOUNTER — APPOINTMENT (OUTPATIENT)
Dept: MAMMOGRAPHY | Facility: IMAGING CENTER | Age: 34
End: 2024-12-19

## 2024-12-19 DIAGNOSIS — Z12.31 ENCOUNTER FOR SCREENING MAMMOGRAM FOR MALIGNANT NEOPLASM OF BREAST: ICD-10-CM

## 2024-12-19 DIAGNOSIS — Z98.890 OTHER SPECIFIED POSTPROCEDURAL STATES: Chronic | ICD-10-CM

## 2024-12-19 PROCEDURE — 76641 ULTRASOUND BREAST COMPLETE: CPT | Mod: 26,50

## 2024-12-19 PROCEDURE — 77067 SCR MAMMO BI INCL CAD: CPT

## 2024-12-19 PROCEDURE — 77067 SCR MAMMO BI INCL CAD: CPT | Mod: 26

## 2024-12-19 PROCEDURE — 76641 ULTRASOUND BREAST COMPLETE: CPT

## 2024-12-19 PROCEDURE — 77063 BREAST TOMOSYNTHESIS BI: CPT | Mod: 26

## 2024-12-19 PROCEDURE — 77063 BREAST TOMOSYNTHESIS BI: CPT

## 2024-12-30 DIAGNOSIS — Z91.89 OTHER SPECIFIED PERSONAL RISK FACTORS, NOT ELSEWHERE CLASSIFIED: ICD-10-CM

## 2024-12-30 DIAGNOSIS — R92.8 OTHER ABNORMAL AND INCONCLUSIVE FINDINGS ON DIAGNOSTIC IMAGING OF BREAST: ICD-10-CM

## 2025-03-11 ENCOUNTER — ASOB RESULT (OUTPATIENT)
Age: 35
End: 2025-03-11

## 2025-03-11 ENCOUNTER — APPOINTMENT (OUTPATIENT)
Dept: OBGYN | Facility: CLINIC | Age: 35
End: 2025-03-11
Payer: COMMERCIAL

## 2025-03-11 VITALS — SYSTOLIC BLOOD PRESSURE: 101 MMHG | DIASTOLIC BLOOD PRESSURE: 70 MMHG

## 2025-03-11 DIAGNOSIS — Z80.41 FAMILY HISTORY OF MALIGNANT NEOPLASM OF OVARY: ICD-10-CM

## 2025-03-11 DIAGNOSIS — Z30.9 ENCOUNTER FOR CONTRACEPTIVE MANAGEMENT, UNSPECIFIED: ICD-10-CM

## 2025-03-11 DIAGNOSIS — Z91.89 OTHER SPECIFIED PERSONAL RISK FACTORS, NOT ELSEWHERE CLASSIFIED: ICD-10-CM

## 2025-03-11 PROCEDURE — 76830 TRANSVAGINAL US NON-OB: CPT

## 2025-03-11 PROCEDURE — 99213 OFFICE O/P EST LOW 20 MIN: CPT

## 2025-03-11 RX ORDER — DROSPIRENONE AND ESTETROL 3-14.2(28)
3-14.2 KIT ORAL
Qty: 3 | Refills: 3 | Status: ACTIVE | COMMUNITY
Start: 2025-03-11 | End: 1900-01-01

## 2025-06-24 ENCOUNTER — OUTPATIENT (OUTPATIENT)
Dept: OUTPATIENT SERVICES | Facility: HOSPITAL | Age: 35
LOS: 1 days | End: 2025-06-24
Payer: COMMERCIAL

## 2025-06-24 ENCOUNTER — APPOINTMENT (OUTPATIENT)
Dept: MRI IMAGING | Facility: IMAGING CENTER | Age: 35
End: 2025-06-24
Payer: COMMERCIAL

## 2025-06-24 ENCOUNTER — RESULT REVIEW (OUTPATIENT)
Age: 35
End: 2025-06-24

## 2025-06-24 ENCOUNTER — APPOINTMENT (OUTPATIENT)
Dept: ULTRASOUND IMAGING | Facility: IMAGING CENTER | Age: 35
End: 2025-06-24
Payer: COMMERCIAL

## 2025-06-24 DIAGNOSIS — Z98.890 OTHER SPECIFIED POSTPROCEDURAL STATES: Chronic | ICD-10-CM

## 2025-06-24 DIAGNOSIS — R92.8 OTHER ABNORMAL AND INCONCLUSIVE FINDINGS ON DIAGNOSTIC IMAGING OF BREAST: ICD-10-CM

## 2025-06-24 PROCEDURE — 76642 ULTRASOUND BREAST LIMITED: CPT | Mod: 26,RT

## 2025-06-24 PROCEDURE — 76642 ULTRASOUND BREAST LIMITED: CPT
